# Patient Record
Sex: MALE | Race: WHITE
[De-identification: names, ages, dates, MRNs, and addresses within clinical notes are randomized per-mention and may not be internally consistent; named-entity substitution may affect disease eponyms.]

---

## 2021-12-03 ENCOUNTER — HOSPITAL ENCOUNTER (OUTPATIENT)
Dept: HOSPITAL 19 - SDCO | Age: 31
Discharge: HOME | End: 2021-12-03
Attending: SURGERY
Payer: SELF-PAY

## 2021-12-03 VITALS — DIASTOLIC BLOOD PRESSURE: 85 MMHG | HEART RATE: 72 BPM | SYSTOLIC BLOOD PRESSURE: 133 MMHG

## 2021-12-03 VITALS — DIASTOLIC BLOOD PRESSURE: 89 MMHG | SYSTOLIC BLOOD PRESSURE: 127 MMHG | HEART RATE: 65 BPM

## 2021-12-03 VITALS — HEART RATE: 80 BPM | TEMPERATURE: 98 F | DIASTOLIC BLOOD PRESSURE: 82 MMHG | SYSTOLIC BLOOD PRESSURE: 124 MMHG

## 2021-12-03 VITALS — WEIGHT: 270.95 LBS | BODY MASS INDEX: 46.26 KG/M2 | HEIGHT: 64.02 IN

## 2021-12-03 VITALS — HEART RATE: 84 BPM | DIASTOLIC BLOOD PRESSURE: 86 MMHG | SYSTOLIC BLOOD PRESSURE: 120 MMHG

## 2021-12-03 VITALS — HEART RATE: 111 BPM | DIASTOLIC BLOOD PRESSURE: 107 MMHG | TEMPERATURE: 98.8 F | SYSTOLIC BLOOD PRESSURE: 146 MMHG

## 2021-12-03 VITALS — DIASTOLIC BLOOD PRESSURE: 93 MMHG | SYSTOLIC BLOOD PRESSURE: 134 MMHG | HEART RATE: 67 BPM

## 2021-12-03 DIAGNOSIS — F41.9: ICD-10-CM

## 2021-12-03 DIAGNOSIS — K62.5: ICD-10-CM

## 2021-12-03 DIAGNOSIS — K64.0: Primary | ICD-10-CM

## 2021-12-03 NOTE — NUR
The patient is still talking with . He has finished his food
and drink and appeared to tolerate the activity well. Vital signs appear
stable. Grandma remains at his bedside.
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
## 2021-12-03 NOTE — NUR
The patient arrived back to [place] [facility] from the operating room at this time. The
patient appears alert and oriented and denies any pain or nausea at this time.
The patient's post operative vital signs were started at this time. The
patient agrees to try some orange juice and a muffin at this time. The
patient's grandma, Trixie, is at his bedside at this time. Will continue to
monitor the patient.
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
## 2021-12-03 NOTE — NUR
Alejandra, with social work is at the patient's bedside to speak with him
regarding the sucidical thoughts he is experiencing. Will continue to monitor
the patient.

## 2021-12-03 NOTE — NUR
The patient's IV to left hand was removed and a pressure dressing was applied
to the site. Discharge instructions were reviewed with the patient and his
grandma at this time. They both verbalized understanding and have no questions
for the nurse at this time. The nurse instructed the patient to get dressed
and notify the staff when he is ready to be escorted out.

## 2021-12-03 NOTE — NUR
The patient appears to be resting comfortably on the cart while speaking with
. Call light is within reach. Will continue to monitor the
patient.

## 2021-12-03 NOTE — NUR
The patient was escorted out via wheelchair to a private vehicle by JORGE Chan.
The patient's belongings and discharge paperwork were sent with him. The
patient's grandma is present to drive him home.

## 2023-01-31 ENCOUNTER — HOSPITAL ENCOUNTER (OUTPATIENT)
Dept: HOSPITAL 19 - SDCO | Age: 33
Discharge: HOME | End: 2023-01-31
Attending: SURGERY
Payer: SELF-PAY

## 2023-01-31 VITALS — DIASTOLIC BLOOD PRESSURE: 75 MMHG | HEART RATE: 60 BPM | SYSTOLIC BLOOD PRESSURE: 104 MMHG

## 2023-01-31 VITALS — DIASTOLIC BLOOD PRESSURE: 57 MMHG | HEART RATE: 60 BPM | TEMPERATURE: 97.7 F | SYSTOLIC BLOOD PRESSURE: 98 MMHG

## 2023-01-31 VITALS — SYSTOLIC BLOOD PRESSURE: 104 MMHG | HEART RATE: 54 BPM | DIASTOLIC BLOOD PRESSURE: 74 MMHG

## 2023-01-31 VITALS — SYSTOLIC BLOOD PRESSURE: 117 MMHG | HEART RATE: 62 BPM | DIASTOLIC BLOOD PRESSURE: 74 MMHG

## 2023-01-31 VITALS — HEART RATE: 66 BPM | SYSTOLIC BLOOD PRESSURE: 100 MMHG | DIASTOLIC BLOOD PRESSURE: 66 MMHG

## 2023-01-31 VITALS — HEART RATE: 64 BPM | DIASTOLIC BLOOD PRESSURE: 77 MMHG | TEMPERATURE: 97.8 F | SYSTOLIC BLOOD PRESSURE: 119 MMHG

## 2023-01-31 VITALS — BODY MASS INDEX: 22.22 KG/M2 | WEIGHT: 133.38 LBS | HEIGHT: 65 IN

## 2023-01-31 VITALS — HEART RATE: 60 BPM | DIASTOLIC BLOOD PRESSURE: 81 MMHG | SYSTOLIC BLOOD PRESSURE: 111 MMHG

## 2023-01-31 VITALS — SYSTOLIC BLOOD PRESSURE: 102 MMHG | HEART RATE: 55 BPM | DIASTOLIC BLOOD PRESSURE: 72 MMHG

## 2023-01-31 DIAGNOSIS — F17.210: ICD-10-CM

## 2023-01-31 DIAGNOSIS — K64.0: Primary | ICD-10-CM

## 2023-01-31 NOTE — NUR
PATIENT ALERT AND ORIENTED, DENIES PAIN AND NAUSEA. PATIENT HAD A MUFFIN AND
ORANGE JUICE, BOTH TOLERATED WELL.

## 2023-01-31 NOTE — NUR
PATIENT ALERT AND ORIENTED, DENIES PAIN AND NAUSEA. AMBULATED WITH STEADY GAIT
TO THE RESTROOM AND VOIDED WITHOUT DIFFICULTY. DISCHARGE TEACHING COMPLETED
WITH PRINTED EDUCATION SENT HOME WITH PATIENT. PATIENT AND GRANDMOTHER
VERBALIZED UNDERSTANDING OF TEACHING. IV REMOVED. PATIENT DISCHARGED HOME WITH
GRANDMOTHER AS TRANSPORT.

## 2023-01-31 NOTE — NUR
PATIENT RETURNED TO ROOM 3 FOLLOWING PROCEDURE. PATIENT DROWSY, BUT AROUSES TO
NAME. BREATHING REGULAR AND UNLABORED, 6L VIA FACEMASK. HEART TONES REGULAR.
HANDOFF COMPLETED IN ROOM. SEE CHART FOR VITAL SIGNS. PATIENT GRANDMOTHER
PRESENT IN ROOM.